# Patient Record
Sex: MALE | Race: WHITE | NOT HISPANIC OR LATINO | Employment: OTHER | URBAN - METROPOLITAN AREA
[De-identification: names, ages, dates, MRNs, and addresses within clinical notes are randomized per-mention and may not be internally consistent; named-entity substitution may affect disease eponyms.]

---

## 2024-06-04 ENCOUNTER — OFFICE VISIT (OUTPATIENT)
Dept: CARDIOLOGY | Facility: MEDICAL CENTER | Age: 64
End: 2024-06-04
Attending: INTERNAL MEDICINE
Payer: MEDICARE

## 2024-06-04 VITALS
HEART RATE: 76 BPM | WEIGHT: 231 LBS | BODY MASS INDEX: 32.34 KG/M2 | OXYGEN SATURATION: 95 % | DIASTOLIC BLOOD PRESSURE: 78 MMHG | HEIGHT: 71 IN | RESPIRATION RATE: 17 BRPM | SYSTOLIC BLOOD PRESSURE: 112 MMHG

## 2024-06-04 DIAGNOSIS — I10 HTN (HYPERTENSION), MALIGNANT: ICD-10-CM

## 2024-06-04 DIAGNOSIS — I25.119 CORONARY ARTERY DISEASE INVOLVING NATIVE CORONARY ARTERY OF NATIVE HEART WITH ANGINA PECTORIS (HCC): ICD-10-CM

## 2024-06-04 DIAGNOSIS — E78.2 MIXED HYPERLIPIDEMIA: ICD-10-CM

## 2024-06-04 DIAGNOSIS — R06.02 SOB (SHORTNESS OF BREATH): ICD-10-CM

## 2024-06-04 DIAGNOSIS — Z95.5 STENTED CORONARY ARTERY: ICD-10-CM

## 2024-06-04 LAB — EKG IMPRESSION: NORMAL

## 2024-06-04 PROCEDURE — 93005 ELECTROCARDIOGRAM TRACING: CPT | Performed by: INTERNAL MEDICINE

## 2024-06-04 PROCEDURE — 3074F SYST BP LT 130 MM HG: CPT | Performed by: INTERNAL MEDICINE

## 2024-06-04 PROCEDURE — 99204 OFFICE O/P NEW MOD 45 MIN: CPT | Performed by: INTERNAL MEDICINE

## 2024-06-04 PROCEDURE — 3078F DIAST BP <80 MM HG: CPT | Performed by: INTERNAL MEDICINE

## 2024-06-04 PROCEDURE — 93010 ELECTROCARDIOGRAM REPORT: CPT | Performed by: INTERNAL MEDICINE

## 2024-06-04 PROCEDURE — G2211 COMPLEX E/M VISIT ADD ON: HCPCS | Performed by: INTERNAL MEDICINE

## 2024-06-04 PROCEDURE — 99212 OFFICE O/P EST SF 10 MIN: CPT | Performed by: INTERNAL MEDICINE

## 2024-06-04 RX ORDER — AMLODIPINE BESYLATE 5 MG/1
5 TABLET ORAL DAILY
COMMUNITY
End: 2024-06-04 | Stop reason: SDUPTHER

## 2024-06-04 RX ORDER — CLOPIDOGREL BISULFATE 75 MG/1
75 TABLET ORAL DAILY
COMMUNITY
End: 2024-06-04 | Stop reason: SDUPTHER

## 2024-06-04 RX ORDER — LISINOPRIL 10 MG/1
10 TABLET ORAL DAILY
COMMUNITY
End: 2024-06-04 | Stop reason: SDUPTHER

## 2024-06-04 RX ORDER — LISINOPRIL 10 MG/1
10 TABLET ORAL DAILY
Qty: 100 TABLET | Refills: 4 | Status: SHIPPED | OUTPATIENT
Start: 2024-06-04

## 2024-06-04 RX ORDER — CLOPIDOGREL BISULFATE 75 MG/1
75 TABLET ORAL DAILY
Qty: 100 TABLET | Refills: 4 | Status: SHIPPED | OUTPATIENT
Start: 2024-06-04

## 2024-06-04 RX ORDER — ATORVASTATIN CALCIUM 80 MG/1
80 TABLET, FILM COATED ORAL NIGHTLY
Qty: 100 TABLET | Refills: 4 | Status: SHIPPED | OUTPATIENT
Start: 2024-06-04

## 2024-06-04 RX ORDER — AMLODIPINE BESYLATE 5 MG/1
5 TABLET ORAL DAILY
Qty: 100 TABLET | Refills: 4 | Status: SHIPPED | OUTPATIENT
Start: 2024-06-04

## 2024-06-04 RX ORDER — ATORVASTATIN CALCIUM 80 MG/1
80 TABLET, FILM COATED ORAL NIGHTLY
COMMUNITY
End: 2024-06-04 | Stop reason: SDUPTHER

## 2024-06-04 RX ORDER — IBUPROFEN 200 MG
200 TABLET ORAL EVERY 6 HOURS PRN
COMMUNITY
End: 2024-06-04

## 2024-06-04 ASSESSMENT — ENCOUNTER SYMPTOMS
ORTHOPNEA: 0
DOUBLE VISION: 0
SHORTNESS OF BREATH: 0
HEADACHES: 0
HALLUCINATIONS: 0
ABDOMINAL PAIN: 0
SENSORY CHANGE: 0
COUGH: 0
WEIGHT LOSS: 0
SPEECH CHANGE: 0
MYALGIAS: 0
PALPITATIONS: 0
BRUISES/BLEEDS EASILY: 0
NAUSEA: 0
PND: 0
DIZZINESS: 0
EYE DISCHARGE: 0
CHILLS: 0
BLOOD IN STOOL: 0
CLAUDICATION: 0
FEVER: 0
VOMITING: 0
BLURRED VISION: 0
LOSS OF CONSCIOUSNESS: 0
DEPRESSION: 0
FALLS: 0
EYE PAIN: 0

## 2024-06-04 NOTE — PROGRESS NOTES
Chief Complaint   Patient presents with    Shortness of Breath     NP F/V : SOB        Subjective     Hunter Pastrana is a 64 y.o. male who presents today for established CAD with heart attack in 2023 for which he had coronary stents of unknown distribution, HTN, HLP.    In the interim, patient has been doing well without having any symptoms. Patient denies having chest pain, dyspnea, palpitation, presyncope, syncope episodes. Able to climb up at least 2 flights of stairs.    I have personally interpreted EKG today with patient, there is no evidence of acute coronary syndrome, no evidence of prior infarct, normal LA and QT interval, no significant conduction disease. Sinus rhythm.      No past medical history on file.  No past surgical history on file.  No family history on file.  Social History     Socioeconomic History    Marital status:      Spouse name: Not on file    Number of children: Not on file    Years of education: Not on file    Highest education level: Not on file   Occupational History    Not on file   Tobacco Use    Smoking status: Never    Smokeless tobacco: Not on file   Vaping Use    Vaping status: Never Used   Substance and Sexual Activity    Alcohol use: Never    Drug use: Never    Sexual activity: Not on file   Other Topics Concern    Not on file   Social History Narrative    Not on file     Social Determinants of Health     Financial Resource Strain: Not on file   Food Insecurity: Not on file   Transportation Needs: Not on file   Physical Activity: Not on file   Stress: Not on file   Social Connections: Not on file   Intimate Partner Violence: Not on file   Housing Stability: Not on file     Not on File  Outpatient Encounter Medications as of 6/4/2024   Medication Sig Dispense Refill    ASPIRIN 81 PO Take  by mouth.      amLODIPine (NORVASC) 5 MG Tab Take 1 Tablet by mouth every day. 100 Tablet 4    atorvastatin (LIPITOR) 80 MG tablet Take 1 Tablet by mouth every evening. 100 Tablet 4     clopidogrel (PLAVIX) 75 MG Tab Take 1 Tablet by mouth every day. 100 Tablet 4    lisinopril (PRINIVIL) 10 MG Tab Take 1 Tablet by mouth every day. 100 Tablet 4    Metoprolol Succinate 50 MG Capsule ER 24 Hour Sprinkle Take 50 mg by mouth every day. 100 Capsule 4    [DISCONTINUED] ibuprofen (MOTRIN) 200 MG Tab Take 200 mg by mouth every 6 hours as needed.      [DISCONTINUED] clopidogrel (PLAVIX) 75 MG Tab Take 75 mg by mouth every day.      [DISCONTINUED] lisinopril (PRINIVIL) 10 MG Tab Take 10 mg by mouth every day.      [DISCONTINUED] atorvastatin (LIPITOR) 80 MG tablet Take 80 mg by mouth every evening.      [DISCONTINUED] Metoprolol Succinate 50 MG Capsule ER 24 Hour Sprinkle Take  by mouth.      [DISCONTINUED] amLODIPine (NORVASC) 5 MG Tab Take 5 mg by mouth every day.       No facility-administered encounter medications on file as of 6/4/2024.     Review of Systems   Constitutional:  Negative for chills, fever, malaise/fatigue and weight loss.   HENT:  Negative for ear discharge, ear pain, hearing loss and nosebleeds.    Eyes:  Negative for blurred vision, double vision, pain and discharge.   Respiratory:  Negative for cough and shortness of breath.    Cardiovascular:  Negative for chest pain, palpitations, orthopnea, claudication, leg swelling and PND.   Gastrointestinal:  Negative for abdominal pain, blood in stool, melena, nausea and vomiting.   Genitourinary:  Negative for dysuria and hematuria.   Musculoskeletal:  Negative for falls, joint pain and myalgias.   Skin:  Negative for itching and rash.   Neurological:  Negative for dizziness, sensory change, speech change, loss of consciousness and headaches.   Endo/Heme/Allergies:  Negative for environmental allergies. Does not bruise/bleed easily.   Psychiatric/Behavioral:  Negative for depression, hallucinations and suicidal ideas.               Objective     /78 (BP Location: Right arm, Patient Position: Sitting, BP Cuff Size: Adult)   Pulse 76    "Resp 17   Ht 1.803 m (5' 11\")   Wt 105 kg (231 lb)   SpO2 95%   BMI 32.22 kg/m²     Physical Exam  Vitals and nursing note reviewed.   Constitutional:       General: He is not in acute distress.     Appearance: He is not diaphoretic.   HENT:      Head: Normocephalic and atraumatic.      Right Ear: External ear normal.      Left Ear: External ear normal.      Nose: No congestion or rhinorrhea.   Eyes:      General:         Right eye: No discharge.         Left eye: No discharge.   Neck:      Thyroid: No thyromegaly.      Vascular: No JVD.   Cardiovascular:      Rate and Rhythm: Normal rate and regular rhythm.      Pulses: Normal pulses.   Pulmonary:      Effort: No respiratory distress.   Abdominal:      General: There is no distension.      Tenderness: There is no abdominal tenderness.   Musculoskeletal:         General: No swelling or tenderness.      Right lower leg: No edema.      Left lower leg: No edema.   Skin:     General: Skin is warm and dry.   Neurological:      Mental Status: He is alert and oriented to person, place, and time.      Cranial Nerves: No cranial nerve deficit.   Psychiatric:         Behavior: Behavior normal.                Assessment & Plan     1. SOB (shortness of breath)  EKG      2. Stented coronary artery  atorvastatin (LIPITOR) 80 MG tablet    clopidogrel (PLAVIX) 75 MG Tab      3. Coronary artery disease involving native coronary artery of native heart with angina pectoris (HCC)  atorvastatin (LIPITOR) 80 MG tablet    clopidogrel (PLAVIX) 75 MG Tab    EC-ECHOCARDIOGRAM COMPLETE W/O CONT    [id number] PET W/CT ATTENUATION      4. HTN (hypertension), malignant  amLODIPine (NORVASC) 5 MG Tab    lisinopril (PRINIVIL) 10 MG Tab    Metoprolol Succinate 50 MG Capsule ER 24 Hour Sprinkle      5. Mixed hyperlipidemia  atorvastatin (LIPITOR) 80 MG tablet          Medical Decision Making: Today's Assessment/Status/Plan:   Coronary arterial disease s/p coronary stents in " 2023:  Betablocker as Toprol 50 mg po 1x daily.  Clopidogrel 75mg 1x daily  Statin as Atorvastatin 80 mg po daily  ACE-I as Lisinopril 10 mg po daily.    At this time, to further risk stratify, I will order a transthoracic echocardiogram to assess cardiac and valvular functions. I will also order a myocardial PET scan stress test to assess for coronary ischemia.     Hypertension:  Optimize control with BB, ACE-I or ARB as permitted above in conjunction with CAD treatment.  Continue Amlodipine 5 mg daily.     Hyperlipidemia:  Optimize statin as within guidelines of CAD treatment as above.    Overall, based on prior history of obstructive coronary arterial disease, patient is at at high risk for recurrent events and will require consistent ongoing guidelines directed medical therapy to reduce his cardiovascular mortality and associated complications.  This visit encounter signifies the visit complexity inherent to evaluation and management associated with medical care services that serve as the continuing focal point for all needed health care services and/or with medical care services that are part of ongoing care related to this patient's single, serious condition, complex cardiac condition.    Flo Dias M.D.

## 2024-06-07 ENCOUNTER — HOSPITAL ENCOUNTER (OUTPATIENT)
Dept: RADIOLOGY | Facility: MEDICAL CENTER | Age: 64
End: 2024-06-07
Payer: MEDICARE

## 2024-06-12 ENCOUNTER — TELEPHONE (OUTPATIENT)
Dept: CARDIOLOGY | Facility: MEDICAL CENTER | Age: 64
End: 2024-06-12

## 2024-06-12 ENCOUNTER — HOSPITAL ENCOUNTER (OUTPATIENT)
Dept: CARDIOLOGY | Facility: MEDICAL CENTER | Age: 64
End: 2024-06-12
Attending: INTERNAL MEDICINE
Payer: MEDICARE

## 2024-06-12 DIAGNOSIS — I25.119 CORONARY ARTERY DISEASE INVOLVING NATIVE CORONARY ARTERY OF NATIVE HEART WITH ANGINA PECTORIS (HCC): ICD-10-CM

## 2024-06-12 LAB
LV EJECT FRACT  99904: 55
LV EJECT FRACT MOD 2C 99903: 54.64
LV EJECT FRACT MOD 4C 99902: 62.92
LV EJECT FRACT MOD BP 99901: 50.16

## 2024-06-12 PROCEDURE — 93306 TTE W/DOPPLER COMPLETE: CPT

## 2024-06-12 PROCEDURE — 93306 TTE W/DOPPLER COMPLETE: CPT | Mod: 26 | Performed by: INTERNAL MEDICINE

## 2024-06-12 NOTE — TELEPHONE ENCOUNTER
Message  Received: Today  HEBER Carlisle R.N.  Dear Lyric,    Can you please let Hunter Pastrana know that result is ok and I will see patient as scheduled?    Thank you,  Aron.          EC-ECHOCARDIOGRAM COMPLETE W/O CONT  ------------------------------------------------------------------------------    Attempted to call pt, unable to LVM d/t busy tone .

## 2024-06-12 NOTE — RESULT ENCOUNTER NOTE
Dear Lyric,    Can you please let Hunter Pastrana know that result is ok and I will see patient as scheduled?    Thank you,  Aron.

## 2024-06-19 ENCOUNTER — HOSPITAL ENCOUNTER (OUTPATIENT)
Dept: RADIOLOGY | Facility: MEDICAL CENTER | Age: 64
End: 2024-06-19
Attending: INTERNAL MEDICINE
Payer: MEDICARE

## 2024-06-19 DIAGNOSIS — I25.119 CORONARY ARTERY DISEASE INVOLVING NATIVE CORONARY ARTERY OF NATIVE HEART WITH ANGINA PECTORIS (HCC): ICD-10-CM

## 2024-06-19 PROCEDURE — 78431 MYOCRD IMG PET RST&STRS CT: CPT

## 2024-06-19 PROCEDURE — 93018 CV STRESS TEST I&R ONLY: CPT | Performed by: INTERNAL MEDICINE

## 2024-06-19 PROCEDURE — 78431 MYOCRD IMG PET RST&STRS CT: CPT | Mod: 26 | Performed by: INTERNAL MEDICINE

## 2024-09-20 DIAGNOSIS — Z95.5 STENTED CORONARY ARTERY: ICD-10-CM

## 2024-09-20 DIAGNOSIS — I25.119 CORONARY ARTERY DISEASE INVOLVING NATIVE CORONARY ARTERY OF NATIVE HEART WITH ANGINA PECTORIS (HCC): ICD-10-CM

## 2024-09-20 DIAGNOSIS — E78.2 MIXED HYPERLIPIDEMIA: ICD-10-CM

## 2024-09-20 RX ORDER — ATORVASTATIN CALCIUM 80 MG/1
80 TABLET, FILM COATED ORAL NIGHTLY
Qty: 100 TABLET | Refills: 4 | Status: SHIPPED | OUTPATIENT
Start: 2024-09-20

## 2024-10-01 ENCOUNTER — TELEPHONE (OUTPATIENT)
Dept: CARDIOLOGY | Facility: MEDICAL CENTER | Age: 64
End: 2024-10-01
Payer: MEDICARE

## 2024-12-04 ENCOUNTER — OFFICE VISIT (OUTPATIENT)
Dept: CARDIOLOGY | Facility: MEDICAL CENTER | Age: 64
End: 2024-12-04
Attending: INTERNAL MEDICINE
Payer: MEDICARE

## 2024-12-04 VITALS
WEIGHT: 238.4 LBS | SYSTOLIC BLOOD PRESSURE: 110 MMHG | HEART RATE: 87 BPM | DIASTOLIC BLOOD PRESSURE: 60 MMHG | RESPIRATION RATE: 17 BRPM | HEIGHT: 71 IN | OXYGEN SATURATION: 95 % | BODY MASS INDEX: 33.38 KG/M2

## 2024-12-04 DIAGNOSIS — I25.119 CORONARY ARTERY DISEASE INVOLVING NATIVE CORONARY ARTERY OF NATIVE HEART WITH ANGINA PECTORIS (HCC): ICD-10-CM

## 2024-12-04 DIAGNOSIS — I10 HTN (HYPERTENSION), MALIGNANT: ICD-10-CM

## 2024-12-04 DIAGNOSIS — E78.2 MIXED HYPERLIPIDEMIA: ICD-10-CM

## 2024-12-04 DIAGNOSIS — Z95.5 STENTED CORONARY ARTERY: ICD-10-CM

## 2024-12-04 PROCEDURE — 3078F DIAST BP <80 MM HG: CPT | Performed by: INTERNAL MEDICINE

## 2024-12-04 PROCEDURE — 99211 OFF/OP EST MAY X REQ PHY/QHP: CPT | Performed by: INTERNAL MEDICINE

## 2024-12-04 PROCEDURE — 3074F SYST BP LT 130 MM HG: CPT | Performed by: INTERNAL MEDICINE

## 2024-12-04 PROCEDURE — G2211 COMPLEX E/M VISIT ADD ON: HCPCS | Performed by: INTERNAL MEDICINE

## 2024-12-04 PROCEDURE — 99214 OFFICE O/P EST MOD 30 MIN: CPT | Performed by: INTERNAL MEDICINE

## 2024-12-04 RX ORDER — ATORVASTATIN CALCIUM 80 MG/1
80 TABLET, FILM COATED ORAL NIGHTLY
Qty: 100 TABLET | Refills: 4 | Status: SHIPPED | OUTPATIENT
Start: 2024-12-04

## 2024-12-04 RX ORDER — AMLODIPINE BESYLATE 5 MG/1
5 TABLET ORAL DAILY
Qty: 100 TABLET | Refills: 4 | Status: SHIPPED | OUTPATIENT
Start: 2024-12-04

## 2024-12-04 RX ORDER — CLOPIDOGREL BISULFATE 75 MG/1
75 TABLET ORAL DAILY
Qty: 100 TABLET | Refills: 4 | Status: SHIPPED | OUTPATIENT
Start: 2024-12-04

## 2024-12-04 RX ORDER — LISINOPRIL 10 MG/1
10 TABLET ORAL DAILY
Qty: 100 TABLET | Refills: 4 | Status: SHIPPED | OUTPATIENT
Start: 2024-12-04

## 2024-12-04 ASSESSMENT — ENCOUNTER SYMPTOMS
WEIGHT LOSS: 0
LOSS OF CONSCIOUSNESS: 0
DEPRESSION: 0
BLOOD IN STOOL: 0
HEADACHES: 0
MYALGIAS: 0
DIZZINESS: 0
NAUSEA: 0
ABDOMINAL PAIN: 0
CLAUDICATION: 0
PALPITATIONS: 0
EYE DISCHARGE: 0
BLURRED VISION: 0
SENSORY CHANGE: 0
ORTHOPNEA: 0
COUGH: 0
DOUBLE VISION: 0
SPEECH CHANGE: 0
HALLUCINATIONS: 0
FEVER: 0
EYE PAIN: 0
FALLS: 0
BRUISES/BLEEDS EASILY: 0
PND: 0
CHILLS: 0
VOMITING: 0
SHORTNESS OF BREATH: 0

## 2024-12-04 NOTE — PROGRESS NOTES
No chief complaint on file.      Subjective     Hunter Pastrana is a 64 y.o. male who presents today for established CAD with heart attack in 2023 for which he had coronary stents of unknown distribution, HTN, HLP.    06/2024 I have independently interpreted and reviewed cardiac PET test's actual images and EKG tracing with patient which showed normal left ventricular systolic function. No coronary ischemia.    06/2024 I have independently interpreted and reviewed echocardiogram's actual images with patient which showed normal left ventricular systolic function. No wall motion abnormality. No evidence of pulmonary hypertension. No significant valvular disease.    In the interim, patient has been doing well without having any symptoms. Patient denies having chest pain, dyspnea, palpitation, presyncope, syncope episodes. Able to climb up at least 2 flights of stairs.    I have personally interpreted EKG today with patient, there is no evidence of acute coronary syndrome, no evidence of prior infarct, normal [place] and QT interval, no significant conduction disease. Sinus rhythm.      History reviewed. No pertinent past medical history.  History reviewed. No pertinent surgical history.  History reviewed. No pertinent family history.  Social History     Socioeconomic History    Marital status:      Spouse name: Not on file    Number of children: Not on file    Years of education: Not on file    Highest education level: Not on file   Occupational History    Not on file   Tobacco Use    Smoking status: Never    Smokeless tobacco: Never   Vaping Use    Vaping status: Never Used   Substance and Sexual Activity    Alcohol use: Never    Drug use: Never    Sexual activity: Not on file   Other Topics Concern    Not on file   Social History Narrative    Not on file     Social Drivers of Health     Financial Resource Strain: Not on file   Food Insecurity: Not on file   Transportation Needs: Not on file   Physical Activity: Not on  file   Stress: Not on file   Social Connections: Not on file   Intimate Partner Violence: Not on file   Housing Stability: Not on file     Not on File  Outpatient Encounter Medications as of 12/4/2024   Medication Sig Dispense Refill    amLODIPine (NORVASC) 5 MG Tab Take 1 Tablet by mouth every day. 100 Tablet 4    atorvastatin (LIPITOR) 80 MG tablet Take 1 Tablet by mouth every evening. 100 Tablet 4    clopidogrel (PLAVIX) 75 MG Tab Take 1 Tablet by mouth every day. 100 Tablet 4    lisinopril (PRINIVIL) 10 MG Tab Take 1 Tablet by mouth every day. 100 Tablet 4    Metoprolol Succinate 50 MG Capsule ER 24 Hour Sprinkle Take 50 mg by mouth every day. 100 Capsule 4    [DISCONTINUED] atorvastatin (LIPITOR) 80 MG tablet Take 1 Tablet by mouth every evening. 100 Tablet 4    [DISCONTINUED] ASPIRIN 81 PO Take  by mouth.      [DISCONTINUED] amLODIPine (NORVASC) 5 MG Tab Take 1 Tablet by mouth every day. 100 Tablet 4    [DISCONTINUED] clopidogrel (PLAVIX) 75 MG Tab Take 1 Tablet by mouth every day. 100 Tablet 4    [DISCONTINUED] lisinopril (PRINIVIL) 10 MG Tab Take 1 Tablet by mouth every day. 100 Tablet 4     No facility-administered encounter medications on file as of 12/4/2024.     Review of Systems   Constitutional:  Negative for chills, fever, malaise/fatigue and weight loss.   HENT:  Negative for ear discharge, ear pain, hearing loss and nosebleeds.    Eyes:  Negative for blurred vision, double vision, pain and discharge.   Respiratory:  Negative for cough and shortness of breath.    Cardiovascular:  Negative for chest pain, palpitations, orthopnea, claudication, leg swelling and PND.   Gastrointestinal:  Negative for abdominal pain, blood in stool, melena, nausea and vomiting.   Genitourinary:  Negative for dysuria and hematuria.   Musculoskeletal:  Negative for falls, joint pain and myalgias.   Skin:  Negative for itching and rash.   Neurological:  Negative for dizziness, sensory change, speech change, loss of  "consciousness and headaches.   Endo/Heme/Allergies:  Negative for environmental allergies. Does not bruise/bleed easily.   Psychiatric/Behavioral:  Negative for depression, hallucinations and suicidal ideas.               Objective     /60 (BP Location: Left arm, Patient Position: Sitting, BP Cuff Size: Adult)   Pulse 87   Resp 17   Ht 1.803 m (5' 11\")   Wt 108 kg (238 lb 6.4 oz)   SpO2 95%   BMI 33.25 kg/m²     Physical Exam  Vitals and nursing note reviewed.   Constitutional:       General: He is not in acute distress.     Appearance: He is not diaphoretic.   HENT:      Head: Normocephalic and atraumatic.      Right Ear: External ear normal.      Left Ear: External ear normal.      Nose: No congestion or rhinorrhea.   Eyes:      General:         Right eye: No discharge.         Left eye: No discharge.   Neck:      Thyroid: No thyromegaly.      Vascular: No JVD.   Cardiovascular:      Rate and Rhythm: Normal rate and regular rhythm.      Pulses: Normal pulses.   Pulmonary:      Effort: No respiratory distress.   Abdominal:      General: There is no distension.      Tenderness: There is no abdominal tenderness.   Musculoskeletal:         General: No swelling or tenderness.      Right lower leg: No edema.      Left lower leg: No edema.   Skin:     General: Skin is warm and dry.   Neurological:      Mental Status: He is alert and oriented to person, place, and time.      Cranial Nerves: No cranial nerve deficit.   Psychiatric:         Behavior: Behavior normal.                Assessment & Plan     1. HTN (hypertension), malignant  amLODIPine (NORVASC) 5 MG Tab    lisinopril (PRINIVIL) 10 MG Tab      2. Stented coronary artery  atorvastatin (LIPITOR) 80 MG tablet    clopidogrel (PLAVIX) 75 MG Tab      3. Coronary artery disease involving native coronary artery of native heart with angina pectoris (HCC)  atorvastatin (LIPITOR) 80 MG tablet    clopidogrel (PLAVIX) 75 MG Tab    Basic Metabolic Panel    LIPID " PANEL      4. Mixed hyperlipidemia  atorvastatin (LIPITOR) 80 MG tablet    Basic Metabolic Panel    LIPID PANEL            Medical Decision Making: Today's Assessment/Status/Plan:   Coronary arterial disease s/p coronary stents in 2023:  Betablocker as Toprol 50 mg po 1x daily.  Clopidogrel 75mg 1x daily. Stop ASA.  Statin as Atorvastatin 80 mg po daily  ACE-I as Lisinopril 10 mg po daily.    At this time, to further risk stratify, I will order a transthoracic echocardiogram to assess cardiac and valvular functions. I will also order a myocardial PET scan stress test to assess for coronary ischemia.     Hypertension:  Optimize control with BB, ACE-I or ARB as permitted above in conjunction with CAD treatment.  Continue Amlodipine 5 mg daily.     Hyperlipidemia:  Optimize statin as within guidelines of CAD treatment as above.    Overall, based on prior history of obstructive coronary arterial disease, patient is at at high risk for recurrent events and will require consistent ongoing guidelines directed medical therapy to reduce his cardiovascular mortality and associated complications.  This visit encounter signifies the visit complexity inherent to evaluation and management associated with medical care services that serve as the continuing focal point for all needed health care services and/or with medical care services that are part of ongoing care related to this patient's single, serious condition, complex cardiac condition.    Flo Dias M.D.

## 2025-03-21 DIAGNOSIS — I25.119 CORONARY ARTERY DISEASE INVOLVING NATIVE CORONARY ARTERY OF NATIVE HEART WITH ANGINA PECTORIS (HCC): ICD-10-CM

## 2025-04-16 DIAGNOSIS — I10 HTN (HYPERTENSION), MALIGNANT: ICD-10-CM

## 2025-04-16 NOTE — TELEPHONE ENCOUNTER
Is the patient due for a refill? Yes    Was the patient seen the last 15 months? Yes    Date of last office visit: 12/4/24    Does the patient have an upcoming appointment?  Yes   If yes, When? 12/4/25    Provider to refill: TT    Does the patient have skilled nursing Plus and need 100-day supply? (This applies to ALL medications) Patient does not have SCP

## 2025-04-17 RX ORDER — METOPROLOL SUCCINATE 50 MG/1
50 TABLET, EXTENDED RELEASE ORAL DAILY
Qty: 100 TABLET | Refills: 2 | Status: SHIPPED | OUTPATIENT
Start: 2025-04-17

## 2025-08-08 DIAGNOSIS — I10 HTN (HYPERTENSION), MALIGNANT: ICD-10-CM

## 2025-08-08 DIAGNOSIS — E78.2 MIXED HYPERLIPIDEMIA: ICD-10-CM

## 2025-08-08 DIAGNOSIS — I25.119 CORONARY ARTERY DISEASE INVOLVING NATIVE CORONARY ARTERY OF NATIVE HEART WITH ANGINA PECTORIS (HCC): ICD-10-CM

## 2025-08-08 DIAGNOSIS — Z79.899 HIGH RISK MEDICATION USE: ICD-10-CM

## 2025-08-08 DIAGNOSIS — Z95.5 STENTED CORONARY ARTERY: Primary | ICD-10-CM

## 2025-08-12 RX ORDER — LISINOPRIL 10 MG/1
10 TABLET ORAL DAILY
Qty: 90 TABLET | Refills: 1 | OUTPATIENT
Start: 2025-08-12

## 2025-08-18 DIAGNOSIS — I25.119 CORONARY ARTERY DISEASE INVOLVING NATIVE CORONARY ARTERY OF NATIVE HEART WITH ANGINA PECTORIS (HCC): ICD-10-CM
